# Patient Record
Sex: MALE | Race: WHITE | URBAN - METROPOLITAN AREA
[De-identification: names, ages, dates, MRNs, and addresses within clinical notes are randomized per-mention and may not be internally consistent; named-entity substitution may affect disease eponyms.]

---

## 2017-11-24 RX ORDER — METOPROLOL SUCCINATE 25 MG/1
25 TABLET, EXTENDED RELEASE ORAL DAILY
COMMUNITY
End: 2017-11-24

## 2017-11-24 RX ORDER — PRIMIDONE 50 MG/1
50 TABLET ORAL 4 TIMES DAILY
COMMUNITY

## 2017-11-24 RX ORDER — FOSINOPRIL SODIUM 40 MG/1
40 TABLET ORAL DAILY
COMMUNITY
End: 2017-11-24

## 2017-11-24 RX ORDER — VALSARTAN AND HYDROCHLOROTHIAZIDE 320; 25 MG/1; MG/1
1 TABLET, FILM COATED ORAL DAILY
COMMUNITY

## 2017-11-24 RX ORDER — AMLODIPINE BESYLATE 10 MG/1
10 TABLET ORAL 2 TIMES DAILY
COMMUNITY

## 2017-11-24 RX ORDER — ESOMEPRAZOLE MAGNESIUM 20 MG/1
20 FOR SUSPENSION ORAL DAILY
COMMUNITY

## 2017-11-24 RX ORDER — CLOPIDOGREL BISULFATE 75 MG/1
75 TABLET ORAL DAILY
COMMUNITY

## 2017-11-24 RX ORDER — GLIPIZIDE 10 MG/1
10 TABLET ORAL
COMMUNITY

## 2017-11-24 RX ORDER — HYDROCHLOROTHIAZIDE 12.5 MG/1
12.5 CAPSULE, GELATIN COATED ORAL DAILY
COMMUNITY
End: 2017-11-24

## 2017-11-24 RX ORDER — NITROGLYCERIN 0.4 MG/1
0.4 TABLET SUBLINGUAL EVERY 5 MIN PRN
COMMUNITY

## 2017-11-24 RX ORDER — HYDRALAZINE HYDROCHLORIDE 25 MG/1
25 TABLET, FILM COATED ORAL 4 TIMES DAILY
COMMUNITY
End: 2017-11-24

## 2017-11-24 RX ORDER — ATORVASTATIN CALCIUM 40 MG/1
40 TABLET, FILM COATED ORAL DAILY
COMMUNITY

## 2017-11-28 ENCOUNTER — HOSPITAL ENCOUNTER (OUTPATIENT)
Dept: SURGERY | Age: 72
Discharge: HOME | End: 2017-11-28
Attending: UROLOGY | Admitting: UROLOGY

## 2017-11-28 VITALS
RESPIRATION RATE: 14 BRPM | DIASTOLIC BLOOD PRESSURE: 80 MMHG | BODY MASS INDEX: 27.51 KG/M2 | WEIGHT: 192.13 LBS | OXYGEN SATURATION: 95 % | TEMPERATURE: 96.7 F | SYSTOLIC BLOOD PRESSURE: 141 MMHG | HEIGHT: 70 IN | HEART RATE: 52 BPM

## 2017-11-28 DIAGNOSIS — R52 PAIN: ICD-10-CM

## 2017-11-28 LAB
ANION GAP SERPL CALCULATED.3IONS-SCNC: 14 MMOL/L (ref 3–16)
BUN BLDV-MCNC: 19 MG/DL (ref 7–20)
CALCIUM SERPL-MCNC: 8.9 MG/DL (ref 8.3–10.6)
CHLORIDE BLD-SCNC: 100 MMOL/L (ref 99–110)
CO2: 26 MMOL/L (ref 21–32)
CREAT SERPL-MCNC: 1 MG/DL (ref 0.8–1.3)
GFR AFRICAN AMERICAN: >60
GFR NON-AFRICAN AMERICAN: >60
GLUCOSE BLD-MCNC: 143 MG/DL (ref 70–99)
GLUCOSE BLD-MCNC: 167 MG/DL (ref 70–99)
HCT VFR BLD CALC: 49.6 % (ref 40.5–52.5)
HEMOGLOBIN: 16.7 G/DL (ref 13.5–17.5)
MCH RBC QN AUTO: 31.9 PG (ref 26–34)
MCHC RBC AUTO-ENTMCNC: 33.7 G/DL (ref 31–36)
MCV RBC AUTO: 94.8 FL (ref 80–100)
PDW BLD-RTO: 13.2 % (ref 12.4–15.4)
PERFORMED ON: ABNORMAL
PLATELET # BLD: 161 K/UL (ref 135–450)
PMV BLD AUTO: 9.3 FL (ref 5–10.5)
POTASSIUM SERPL-SCNC: 3.8 MMOL/L (ref 3.5–5.1)
RBC # BLD: 5.23 M/UL (ref 4.2–5.9)
SODIUM BLD-SCNC: 140 MMOL/L (ref 136–145)
WBC # BLD: 5.4 K/UL (ref 4–11)

## 2017-11-28 RX ORDER — HYDROCODONE BITARTRATE AND ACETAMINOPHEN 5; 325 MG/1; MG/1
1-2 TABLET ORAL EVERY 4 HOURS PRN
Qty: 30 TABLET | Refills: 0 | Status: SHIPPED | OUTPATIENT
Start: 2017-11-28 | End: 2017-12-05

## 2017-11-28 RX ORDER — CEFAZOLIN SODIUM 2 G/100ML
2 INJECTION, SOLUTION INTRAVENOUS
Status: COMPLETED | OUTPATIENT
Start: 2017-11-28 | End: 2017-11-28

## 2017-11-28 RX ORDER — SODIUM CHLORIDE 9 MG/ML
INJECTION, SOLUTION INTRAVENOUS CONTINUOUS
Status: DISCONTINUED | OUTPATIENT
Start: 2017-11-28 | End: 2017-11-29 | Stop reason: HOSPADM

## 2017-11-28 RX ORDER — LIDOCAINE HYDROCHLORIDE 10 MG/ML
0.5 INJECTION, SOLUTION EPIDURAL; INFILTRATION; INTRACAUDAL; PERINEURAL ONCE
Status: DISCONTINUED | OUTPATIENT
Start: 2017-11-28 | End: 2017-11-29 | Stop reason: HOSPADM

## 2017-11-28 RX ADMIN — CEFAZOLIN SODIUM 2 G: 2 INJECTION, SOLUTION INTRAVENOUS at 11:46

## 2017-11-28 RX ADMIN — SODIUM CHLORIDE: 9 INJECTION, SOLUTION INTRAVENOUS at 10:31

## 2017-11-28 ASSESSMENT — PAIN - FUNCTIONAL ASSESSMENT: PAIN_FUNCTIONAL_ASSESSMENT: 0-10

## 2017-11-28 ASSESSMENT — PAIN SCALES - GENERAL: PAINLEVEL_OUTOF10: 0

## 2017-11-28 NOTE — OP NOTE
Urology Operative Report  [facility]    Provider: Luz Wilson MD Patient ID:  Admission Date: 2017 Name: Clay Chaparro Date: 2017  MRN: [id number]   Patient Location: Room/bed info not found : 1945  Attending: Luz Wilson MD Date of Service: 2017  PCP: No primary care provider on file. Date of Operation: 2017    Preoperative Diagnosis: RIGHT side renal stone    Postoperative Diagnosis: same    Procedure:    1. Cystoscopy  2. Right side ureteral stent placement  3. Fluoroscopic imaging < 1 hr physician time    Surgeon:   Luz Wilson MD    Anesthesia: Monitored Local Anesthesia with Sedation    Indications: Keila Godfrey is a 67 y.o. male who presents for the above named surgery. Informed consent was obtained and the risks, benefits, and details of the procedure were explained to the patient who elected to proceed. Details of Procedure: The patient was brought to the operating room and placed in the supine position on the operating room table. SCDs were placed on the lower extremities. Following induction of anesthesia the patient was positioned in a lithotomy position, all pressure points were padded, and the genitals were prepped and draped in the usual sterile fashion. A routine timeout was performed, confirming the patient, procedure, site, risk of fire, patient allergies and confirming that preoperative antibiotics had been administered prior to beginning. A 21 fr rigid cystoscope was advance via a normal appearing urethra into the bladder. The bladder was inspected and there were no suspicious lesions, stones or diverticula seen. Attention was turned to the right ureteral orifice. A 0.035 sensor wire was advance into the right UO and up to the renal pelvis under control of fluoroscopy. Over the wire a stent was advanced under control of fluoroscopy with good curl in the renal pelvis and the urinary bladder.  The bladder

## 2017-11-28 NOTE — ANESTHESIA PRE-OP
[address] Anesthesiology  Pre-Anesthesia Evaluation/Consultation       Name:  Henrietta Ferreira                                         Age:  67 y.o. MRN:    [id number]           Procedure (Scheduled): CYSTOSCOPY RETROGRADE PYELOGRAM   Surgeon:     Dr. Gabriella Washington MD     Allergies   Allergen Reactions    Clonidine Derivatives      Heart rate down to 39 bpm     There is no problem list on file for this patient. Past Medical History:   Diagnosis Date    CAD (coronary artery disease)     Calculus of right kidney     Coronary atherosclerosis     Diabetes ([address]     Esophageal reflux     Hypertension     Myocardial infarction x 3, last 5/16/2017, 1991, 1997     Sleep apnea     uses bipap    Tremor, hereditary, benign      Past Surgical History:   Procedure Laterality Date    CARDIAC SURGERY      COLONOSCOPY      CORONARY ANGIOPLASTY WITH STENT PLACEMENT      [id number]    CORONARY ARTERY BYPASS GRAFT  2007    x 3 vessel    ENDOSCOPY, COLON, DIAGNOSTIC      INCISIONAL HERNIA REPAIR      INGUINAL HERNIA REPAIR Right      Social History   Substance Use Topics    Smoking status: Former Smoker     Years: 1.00     Types: Cigarettes     Quit date: 9/14/2006    Smokeless tobacco: Never Used    Alcohol use No       Prior to Admission medications    Medication Sig Start Date End Date Taking?  Authorizing Provider   glipiZIDE (GLUCOTROL) 10 MG tablet Take 10 mg by mouth   Yes Historical Provider, MD   clopidogrel (PLAVIX) 75 MG tablet Take 75 mg by mouth daily   Yes Historical Provider, MD   esomeprazole Magnesium (NEXIUM) 20 MG PACK Take 20 mg by mouth daily   Yes Historical Provider, MD   atorvastatin (LIPITOR) 40 MG tablet Take 40 mg by mouth daily   Yes Historical Provider, MD   valsartan-hydrochlorothiazide (DIOVAN HCT) 320-25 MG per tablet Take 1 tablet by mouth daily   Yes Historical Provider, MD   nitroGLYCERIN (NITROSTAT) 0.4 MG SL tablet Place 0.4 mg under the tongue every 5 minutes results found for: SUZY, [facility]  Recent Labs      11/28/17   1032   GLUCOSE  167*        NPO Status  > 8 hours   Date of last liquid consumption: 11/28/17   Time of last liquid consumption: 0700   Date of last solid food consumption: 11/27/17      Time of last solid consumption: 1900    BMI  Body mass index is 27.57 kg/m². Estimated body mass index is 27.57 kg/m² as calculated from the following:    Height as of this encounter: 5' 10\" (1.778 m). Weight as of this encounter: 192 lb 2 oz (87.1 kg). Additional Testing (Echo, Stress, ECG, PFTs, etc)        Anesthesia Evaluation  Patient summary reviewed and Nursing notes reviewed  Airway: Mallampati: III  TM distance: >3 FB   Neck ROM: full  Mouth opening: > = 3 FB Dental: normal exam         Pulmonary:normal exam    (+) sleep apnea:                             Cardiovascular:  Exercise tolerance: good (>4 METS),   (+) hypertension:, past MI:, CAD:, hyperlipidemia    (-)  angina      Rhythm: regular  Rate: normal                    Neuro/Psych:      (-) CVA           GI/Hepatic/Renal:   (+) GERD: well controlled,           Endo/Other:                     Abdominal:           Vascular:                                        Anesthesia Plan      general     ASA 3     (Plan for GETA with standard ASA monitoring. Additional monitoring as dictated by intra-operative course. Patient appropriately NPO for the procedure. Risk/Benefits reviewed with patient and all anesthetic questions answered prior to procedure.  )  Induction: intravenous. MIPS: Postoperative opioids intended and Prophylactic antiemetics administered. Anesthetic plan and risks discussed with patient. Plan discussed with CRNA. DOS STAFF ADDENDUM:    Pt seen and examined, chart reviewed (including anesthesia, drug and allergy history). No interval changes to history and physical examination.   Anesthetic plan, risks, benefits, alternatives, and personnel involved discussed with patient. Patient verbalized an understanding and agrees to proceed.       Vicente Monsalve DO  November 28, 2017  11:54 AM

## 2017-12-05 ENCOUNTER — HOSPITAL ENCOUNTER (OUTPATIENT)
Dept: SURGERY | Age: 72
Discharge: HOME | End: 2017-12-05
Attending: UROLOGY | Admitting: UROLOGY

## 2017-12-05 VITALS
SYSTOLIC BLOOD PRESSURE: 130 MMHG | DIASTOLIC BLOOD PRESSURE: 73 MMHG | HEIGHT: 70 IN | TEMPERATURE: 96.9 F | HEART RATE: 63 BPM | WEIGHT: 189.3 LBS | RESPIRATION RATE: 19 BRPM | OXYGEN SATURATION: 95 % | BODY MASS INDEX: 27.1 KG/M2

## 2017-12-05 DIAGNOSIS — R52 PAIN: ICD-10-CM

## 2017-12-05 LAB
GLUCOSE BLD-MCNC: 122 MG/DL (ref 70–99)
GLUCOSE BLD-MCNC: 130 MG/DL (ref 70–99)
PERFORMED ON: ABNORMAL
PERFORMED ON: ABNORMAL

## 2017-12-05 RX ORDER — MEPERIDINE HYDROCHLORIDE 25 MG/ML
12.5 INJECTION INTRAMUSCULAR; INTRAVENOUS; SUBCUTANEOUS EVERY 5 MIN PRN
Status: DISCONTINUED | OUTPATIENT
Start: 2017-12-05 | End: 2017-12-06 | Stop reason: HOSPADM

## 2017-12-05 RX ORDER — HYDROMORPHONE HCL 110MG/55ML
0.5 PATIENT CONTROLLED ANALGESIA SYRINGE INTRAVENOUS EVERY 5 MIN PRN
Status: DISCONTINUED | OUTPATIENT
Start: 2017-12-05 | End: 2017-12-06 | Stop reason: HOSPADM

## 2017-12-05 RX ORDER — SODIUM CHLORIDE 9 MG/ML
INJECTION, SOLUTION INTRAVENOUS CONTINUOUS
Status: DISCONTINUED | OUTPATIENT
Start: 2017-12-05 | End: 2017-12-06 | Stop reason: HOSPADM

## 2017-12-05 RX ORDER — AMOXICILLIN 250 MG
1 CAPSULE ORAL 2 TIMES DAILY
Qty: 50 TABLET | Refills: 0 | Status: SHIPPED | OUTPATIENT
Start: 2017-12-05

## 2017-12-05 RX ORDER — CEFAZOLIN SODIUM 2 G/100ML
2 INJECTION, SOLUTION INTRAVENOUS
Status: COMPLETED | OUTPATIENT
Start: 2017-12-05 | End: 2017-12-05

## 2017-12-05 RX ORDER — LIDOCAINE HYDROCHLORIDE 10 MG/ML
1 INJECTION, SOLUTION EPIDURAL; INFILTRATION; INTRACAUDAL; PERINEURAL
Status: ACTIVE | OUTPATIENT
Start: 2017-12-05 | End: 2017-12-05

## 2017-12-05 RX ORDER — ONDANSETRON 2 MG/ML
4 INJECTION INTRAMUSCULAR; INTRAVENOUS
Status: ACTIVE | OUTPATIENT
Start: 2017-12-05 | End: 2017-12-05

## 2017-12-05 RX ORDER — LIDOCAINE HYDROCHLORIDE 10 MG/ML
0.5 INJECTION, SOLUTION EPIDURAL; INFILTRATION; INTRACAUDAL; PERINEURAL ONCE
Status: DISCONTINUED | OUTPATIENT
Start: 2017-12-05 | End: 2017-12-06 | Stop reason: HOSPADM

## 2017-12-05 RX ORDER — HYDROMORPHONE HCL 110MG/55ML
0.25 PATIENT CONTROLLED ANALGESIA SYRINGE INTRAVENOUS EVERY 5 MIN PRN
Status: DISCONTINUED | OUTPATIENT
Start: 2017-12-05 | End: 2017-12-06 | Stop reason: HOSPADM

## 2017-12-05 RX ORDER — HYDROCODONE BITARTRATE AND ACETAMINOPHEN 5; 325 MG/1; MG/1
1 TABLET ORAL
Status: ACTIVE | OUTPATIENT
Start: 2017-12-05 | End: 2017-12-05

## 2017-12-05 RX ORDER — HYDROCODONE BITARTRATE AND ACETAMINOPHEN 5; 325 MG/1; MG/1
1-2 TABLET ORAL EVERY 4 HOURS PRN
Qty: 28 TABLET | Refills: 0 | Status: SHIPPED | OUTPATIENT
Start: 2017-12-05 | End: 2017-12-12

## 2017-12-05 RX ADMIN — CEFAZOLIN SODIUM 2 G: 2 INJECTION, SOLUTION INTRAVENOUS at 14:31

## 2017-12-05 ASSESSMENT — PAIN - FUNCTIONAL ASSESSMENT: PAIN_FUNCTIONAL_ASSESSMENT: 0-10

## 2017-12-05 NOTE — OP NOTE
system under control of fluoroscopy. Over the wire a 11/13 by 36 cm ureteral access sheath was positioned within the ureter. A flexible ureteroscopy was advanced until the stone was identified in the renal pelvis. A 365nm laser fiber was used to perform lithotripsy until all stone burden was fragmented. Based on visual and fluoro images it seemed we had very good fragmentation of the stone. The working wire was replaced into the collecting system under vision. The ureter was inspected on removal of the scope and access sheath and no additional stone burden was seen. The cystoscope was back-loaded over the wire and a stent was advanced under control of fluoroscopy with good curl in the renal pelvis and the urinary bladder. A string was left attached to the distal stent and brought out through the urethral meatus. The bladder was emptied and the scope removed. At the end of the procedure all counts were correct. The patient tolerated the procedure well and was transported to the PACU in stable condition. Findings: successful fragmentation of large renal pelvic stone    Estimated Blood Loss: minimal                  Drains: 6fr x 26cm right right ureteral stent w string          Specimens: none    Complications: none apparent           Disposition:  PACU - hemodynamically stable.             Breann Rodriguez MD  12/5/2017

## 2017-12-05 NOTE — PROGRESS NOTES
Discussed discharge instructions with pt and wife, questions answered, attempting to wean pt off O2. Will monitor.  vss

## 2017-12-05 NOTE — H&P
The history and physical was reviewed. The patient was seen and examined in pre-op and his RIGHT side was marked with his participation. He had a chance to ask questions which were answered. There has been no interval change. Plan to continue to the OR for right ureteroscopy.     Jane Blake  12/5/2017

## 2017-12-05 NOTE — ANESTHESIA POST-OP
Anesthesia Post-op Note    Patient: Misha Burks  MRN: [id number]  YOB: 1945  Date of evaluation: 12/5/2017  Time:  4:09 PM     Procedure(s) Performed: cysto    Last Vitals: BP (!) 152/81   Pulse 85   Temp 97.6 °F (36.4 °C) (Temporal)   Resp 11   Ht 5' 10\" (1.778 m)   Wt 189 lb 4.8 oz (85.9 kg)   SpO2 92%   BMI 27.16 kg/m²     Fabián Phase I: Fabián Score: 8    Fabián Phase II:      Anesthesia Post Evaluation    Final anesthesia type: general  Patient location during evaluation: PACU  Patient participation: complete - patient participated  Level of consciousness: awake and alert  Airway patency: patent  Nausea & Vomiting: no vomiting  Complications: no  Cardiovascular status: hemodynamically stable  Respiratory status: acceptable  Hydration status: euvolemic        Jax Og MD  4:09 PM

## 2017-12-05 NOTE — ANESTHESIA PRE-OP
glipiZIDE (GLUCOTROL) 10 MG tablet Take 10 mg by mouth      esomeprazole Magnesium (NEXIUM) 20 MG PACK Take 20 mg by mouth daily      atorvastatin (LIPITOR) 40 MG tablet Take 40 mg by mouth daily      valsartan-hydrochlorothiazide (DIOVAN HCT) 320-25 MG per tablet Take 1 tablet by mouth daily      metFORMIN (GLUCOPHAGE) 500 MG tablet Take 500 mg by mouth 3 times daily      amLODIPine (NORVASC) 10 MG tablet Take 10 mg by mouth 2 times daily       primidone (MYSOLINE) 50 MG tablet Take 50 mg by mouth 4 times daily       carvedilol (COREG) 6.25 MG tablet Take 6.25 mg by mouth 2 times daily (with meals)      clopidogrel (PLAVIX) 75 MG tablet Take 75 mg by mouth daily      nitroGLYCERIN (NITROSTAT) 0.4 MG SL tablet Place 0.4 mg under the tongue every 5 minutes as needed for Chest pain up to max of 3 total doses. If no relief after 1 dose, call 911.       aspirin 81 MG tablet Take 81 mg by mouth daily       Current Facility-Administered Medications   Medication Dose Route Frequency Provider Last Rate Last Dose    0.9 % sodium chloride infusion   Intravenous Continuous Hodan Mars MD        ceFAZolin (ANCEF) 2 g in dextrose 4 % 100 mL IVPB (premix)  2 g Intravenous On Call to [facility] MD [place] 200 mL/hr at 12/05/17 1431 2 g at 12/05/17 1431    lidocaine PF 1 % injection 0.5 mL  0.5 mL Intradermal Once Hodan Mars MD        0.9 % sodium chloride infusion   Intravenous Continuous Chapincito Meadows MD        lidocaine PF 1 % injection 1 mL  1 mL Intradermal Once PRN Chapincito Meadows MD        HYDROcodone-acetaminophen (NORCO) 5-325 MG per tablet 1 tablet  1 tablet Oral Once PRN Chapincito Meadows MD        ondansetron [facility]) injection 4 mg  4 mg Intravenous Once PRN Chapincito Meadows MD        meperidine (DEMEROL) injection 12.5 mg  12.5 mg Intravenous Q5 Min PRN Chapincito Meadows MD        HYDROmorphone (DILAUDID) injection 0.25 mg  0.25 mg Intravenous Q5 Min PRN Troy Rosado lb 2 oz (87.1 kg)   11/24/17 192 lb (87.1 kg)     Body mass index is 27.16 kg/m². Anesthesia Evaluation  Patient summary reviewed and Nursing notes reviewed no history of anesthetic complications:   Airway: Mallampati: III  TM distance: >3 FB   Neck ROM: full  Mouth opening: > = 3 FB Dental: normal exam         Pulmonary:normal exam  breath sounds clear to auscultation  (+) sleep apnea: on CPAP,      (-) COPD and asthma                          ROS comment: Former smoker   Cardiovascular:    (+) hypertension:, past MI (x3 in past per pt, most recently in 5/2017, per pt cath done but no interventions, med mgmt):, CAD (Follows cardiologist regularly, doing well since last MI, no recent NTG use or symptoms):, CABG/stent (Prior CABG and stents, on DAPT as outpt, held at surgeon discretion):, hyperlipidemia    (-) dysrhythmias,  angina and  CHF (Low normal EF per report, no specific CHF symptoms or hx)    ECG reviewed  Rhythm: regular  Rate: normal  Echocardiogram reviewed    Cleared by cardiology     Beta Blocker:  Dose within 24 Hrs         Neuro/Psych:   (+) neuromuscular disease (essential tremor, benign):,    (-) seizures, TIA and CVA           GI/Hepatic/Renal:   (+) GERD: well controlled, renal disease: kidney stones,           Endo/Other:    (+) Type II DM, , .    (-) hypothyroidism, hyperthyroidism               Abdominal:           Vascular:                                        Anesthesia Plan      general     ASA 3       Induction: intravenous. Anesthetic plan and risks discussed with patient. Plan discussed with CRNA.                   Michoacano Cormier MD   12/5/2017
